# Patient Record
Sex: FEMALE | Race: BLACK OR AFRICAN AMERICAN | NOT HISPANIC OR LATINO | Employment: OTHER | URBAN - METROPOLITAN AREA
[De-identification: names, ages, dates, MRNs, and addresses within clinical notes are randomized per-mention and may not be internally consistent; named-entity substitution may affect disease eponyms.]

---

## 2023-05-03 ENCOUNTER — TELEPHONE (OUTPATIENT)
Dept: OTOLARYNGOLOGY | Facility: CLINIC | Age: 73
End: 2023-05-03
Payer: MEDICARE

## 2023-05-03 NOTE — TELEPHONE ENCOUNTER
Spoke with pt.  PT was informed that we are not taking her insurance.  Pt was given the number to [facility] or to contact her insurance for possible providers for treatments.  Pt voiced understanding

## 2023-05-03 NOTE — TELEPHONE ENCOUNTER
----- Message from Juanis Perez sent at 5/3/2023  3:00 PM CDT -----  .Type:  Patient Returning Call    Who Called:.Amber Huertas   Who Left Message for Patient:  Does the patient know what this is regarding?:  Would the patient rather a call back or a response via [facility]? Call back  Best Call Back Number:.[phone]  Additional Information: